# Patient Record
Sex: FEMALE | NOT HISPANIC OR LATINO | URBAN - METROPOLITAN AREA
[De-identification: names, ages, dates, MRNs, and addresses within clinical notes are randomized per-mention and may not be internally consistent; named-entity substitution may affect disease eponyms.]

---

## 2019-05-02 ENCOUNTER — OFFICE VISIT (OUTPATIENT)
Dept: URBAN - METROPOLITAN AREA CLINIC 17 | Facility: CLINIC | Age: 79
End: 2019-05-02
Payer: MEDICARE

## 2019-05-02 DIAGNOSIS — H35.443 AGE-RELATED RETICULAR DEGENERATION OF RETINA, BILATERAL: ICD-10-CM

## 2019-05-02 DIAGNOSIS — H04.123 DRY EYE SYNDROME OF BILATERAL LACRIMAL GLANDS: ICD-10-CM

## 2019-05-02 DIAGNOSIS — H10.45 OTHER CHRONIC ALLERGIC CONJUNCTIVITIS: ICD-10-CM

## 2019-05-02 DIAGNOSIS — H25.13 AGE-RELATED NUCLEAR CATARACT, BILATERAL: Primary | ICD-10-CM

## 2019-05-02 PROCEDURE — 99214 OFFICE O/P EST MOD 30 MIN: CPT | Performed by: OPTOMETRIST

## 2019-05-02 RX ORDER — NEOMYCIN, POLYMYXIN B SULFATES, DEXAMETHASONE 1; 3.5; 1 MG/G; MG/G; [USP'U]/G
OINTMENT OPHTHALMIC
Qty: 1 | Refills: 0 | Status: ACTIVE
Start: 2019-05-02

## 2019-05-02 RX ORDER — NEOMYCIN SULFATE, POLYMYXIN B SULFATE AND DEXAMETHASONE 3.5; 10000; 1 MG/ML; [USP'U]/ML; MG/ML
SUSPENSION/ DROPS OPHTHALMIC
Qty: 1 | Refills: 0 | Status: ACTIVE
Start: 2019-05-02

## 2019-05-02 ASSESSMENT — INTRAOCULAR PRESSURE
OS: 10
OD: 12

## 2019-05-02 NOTE — IMPRESSION/PLAN
Impression: Other chronic allergic conjunctivitis: H10.45. OU. Plan: Discussed condition and treatment options with patient.  New Medication(s) Rx given today: Kennedy poly dex gtts QID OU x7days and kennedy poly dex ointment at night on bottom lids.  (ERX'd)

## 2019-05-02 NOTE — IMPRESSION/PLAN
Impression: Age-related reticular degeneration of retina, bilateral: H35.443. Plan: Discussed diagnosis in detail with patient. Advised patient of condition. Discussed treatment options with patient. No treatment is required at this time. Will continue to observe condition and or symptoms.

## 2019-05-23 ENCOUNTER — TESTING ONLY (OUTPATIENT)
Dept: URBAN - METROPOLITAN AREA CLINIC 17 | Facility: CLINIC | Age: 79
End: 2019-05-23

## 2019-05-23 DIAGNOSIS — H52.4 PRESBYOPIA: Primary | ICD-10-CM

## 2019-05-23 ASSESSMENT — INTRAOCULAR PRESSURE
OD: 11
OS: 11

## 2019-05-23 ASSESSMENT — VISUAL ACUITY
OD: 20/20
OS: 20/20

## 2019-05-23 ASSESSMENT — KERATOMETRY
OS: 44.00
OD: 44.00